# Patient Record
Sex: FEMALE | Race: WHITE | Employment: FULL TIME | URBAN - METROPOLITAN AREA
[De-identification: names, ages, dates, MRNs, and addresses within clinical notes are randomized per-mention and may not be internally consistent; named-entity substitution may affect disease eponyms.]

---

## 2022-01-17 NOTE — PROGRESS NOTES
HPI: Claudia Pickett (: 1970) is a 54 y.o. female, patient, here for evaluation of the following chief complaint(s):    Arm Pain (right arm pain for \"a few months\". no known injury. hurts to extend. decreased ROM. occasional numbness in 4th and 5th fingers.)  Patient is seen today to evaluate her right arm. She has complained of difficulty lifting a cup of coffee with her right arm especially over the last couple of months. She denies any fall or other injury. The pain started in the fall. She participates in barre class and is having difficulty with planks. She had to discontinue in November. She complains of a combination of pain to the lateral aspect of the right elbow as well as numbness, tingling and paresthesias in the ulnar small and ring fingers. She denies any problems with the left arm and is seen for further treatment of her right arm. Vitals:  Ht 5' 4\" (1.626 m)   Wt 135 lb (61.2 kg)   BMI 23.17 kg/m²    Body mass index is 23.17 kg/m². Allergies   Allergen Reactions    Erythromycin Unknown (comments)     Chest tightness    Sulfa (Sulfonamide Antibiotics) Other (comments)    Tree Nut Hives and Other (comments)     Chest pain       Current Outpatient Medications   Medication Sig    linaclotide (LINZESS PO) Take  by mouth.  methylPREDNISolone (MEDROL DOSEPACK) 4 mg tablet Per dose pack instructions    diclofenac sodium (Pennsaid) 2 % sopk 2 Pump(s) by Apply Externally route two (2) times a day.  fexofenadine (ALLEGRA) 180 mg tablet Take  by mouth daily. No current facility-administered medications for this visit.        Past Medical History:   Diagnosis Date    Allergic rhinitis     Dr. Naomi Leal        Past Surgical History:   Procedure Laterality Date    HX GYN      csxn    HX TONSILLECTOMY  1977       Family History   Problem Relation Age of Onset    Lung Disease Mother         mulitple pneumonias    Cancer Father 64        prostate        Social History     Tobacco Use    Smoking status: Never Smoker    Smokeless tobacco: Never Used   Substance Use Topics    Alcohol use: Yes     Alcohol/week: 1.7 standard drinks     Types: 2 drink(s) per week    Drug use: Not on file        Review of Systems    ROS     Positive for: Musculoskeletal    Negative for: Constitutional, Gastrointestinal, Neurological, Skin, Genitourinary, HENT, Endocrine, Cardiovascular, Eyes, Respiratory, Psychiatric, Allergic/Imm, Heme/Lymph    Last edited by Justin Engle on 1/18/2022  3:45 PM. (History)             Physical Exam    Patient's right elbow is full range of motion but there is point tenderness to the lateral condyle and pain worsened during elbow extension resisted wrist extension testing. She also has tenderness in the cubital tunnel with a positive Tinel's sign producing paresthesias ulnarly into the hand. There is good intrinsic strength. Normal left elbow examination of the left wrist reveals no swelling, edema or warmth, no tenderness to palpation, no pain, normal strength and tone, normal range of motion, no crepitus, normal sensation, no instability or laxity and no known fractures or deformities. Examination of the right wrist reveals no swelling, edema or warmth, no tenderness to palpation, no pain, normal strength and tone, normal range of motion, no crepitus, normal sensation, no instability or laxity and no known fractures or deformities. Examination of the left hand reveals no tenderness to palpation, no pain, normal  strength, normal tone, normal range of motion, no crepitus, no instability or laxity, no known fractures or deformities and normal sensation. Examination of the right hand reveals no tenderness to palpation, no pain, normal  strength, normal tone, normal range of motion, no crepitus, no instability or laxity, no known fractures or deformities and normal sensation.     Imaging:      XR Results (most recent):  Results from Appointment encounter on 01/18/22    XR ELBOW RT AP/LAT    Narrative  AP, lateral x-ray of the right elbow show a bone island in the capitellum but no advanced arthritis or fracture seen. Slight osteopenia. ASSESSMENT/PLAN:  Below is the assessment and plan developed based on review of pertinent history, physical exam, labs, studies, and medications. Patient examination was clinically consistent with right elbow lateral epicondylitis and ulnar neuropathy. She has been symptomatic since the fall and has affected her workout regimen. I recommend an EMG assessment to evaluate for ulnar neuropathy at the elbow. She received home exercise instruction sheets and a prescription for outpatient therapy. I will see her back after the EMG is completed for further review. 1. Right arm pain  -     XR ELBOW RT AP/LAT; Future  -     methylPREDNISolone (MEDROL DOSEPACK) 4 mg tablet; Per dose pack instructions, Normal, Disp-1 Dose Pack, R-0  -     diclofenac sodium (Pennsaid) 2 % sopk; 2 Pump(s) by Apply Externally route two (2) times a day., Normal, Disp-1 Packet, R-3  -     EMG ONE EXTREMITY UPPER RT; Future  2. Lateral epicondylitis of right elbow  3. Ulnar neuropathy at elbow, right      Return for Follow-up after EMG test completion. An electronic signature was used to authenticate this note.   -- Jany Leal MD

## 2022-01-18 ENCOUNTER — OFFICE VISIT (OUTPATIENT)
Dept: ORTHOPEDIC SURGERY | Age: 52
End: 2022-01-18
Payer: COMMERCIAL

## 2022-01-18 VITALS — HEIGHT: 64 IN | WEIGHT: 135 LBS | BODY MASS INDEX: 23.05 KG/M2

## 2022-01-18 DIAGNOSIS — G56.21 ULNAR NEUROPATHY AT ELBOW, RIGHT: ICD-10-CM

## 2022-01-18 DIAGNOSIS — M77.11 LATERAL EPICONDYLITIS OF RIGHT ELBOW: ICD-10-CM

## 2022-01-18 DIAGNOSIS — M79.601 RIGHT ARM PAIN: Primary | ICD-10-CM

## 2022-01-18 PROCEDURE — 99203 OFFICE O/P NEW LOW 30 MIN: CPT | Performed by: ORTHOPAEDIC SURGERY

## 2022-01-18 RX ORDER — DICLOFENAC SODIUM 20 MG/G
2 SOLUTION TOPICAL 2 TIMES DAILY
Qty: 1 PACKET | Refills: 3 | Status: SHIPPED | OUTPATIENT
Start: 2022-01-18

## 2022-01-18 RX ORDER — METHYLPREDNISOLONE 4 MG/1
TABLET ORAL
Qty: 1 DOSE PACK | Refills: 0 | Status: SHIPPED | OUTPATIENT
Start: 2022-01-18

## 2022-01-18 NOTE — PATIENT INSTRUCTIONS
Tennis Elbow: Care Instructions  Overview     Tennis elbow is soreness or pain on the outer part of the elbow. The pain occurs when the tendon is stretched and becomes irritated by repeated twisting of the hand, wrist, and forearm. A tendon is a tough tissue that connects muscle to bone. This injury is common in tennis players. But you also can get it from many activities that work the same muscles. Examples include gardening, painting, and using tools. Tennis elbow usually heals with rest and treatment at home. Follow-up care is a key part of your treatment and safety. Be sure to make and go to all appointments, and call your doctor if you are having problems. It's also a good idea to know your test results and keep a list of the medicines you take. How can you care for yourself at home?    · Rest your fingers, wrist, and forearm. Try to stop or reduce any activity that causes elbow pain. You may have to rest your arm for weeks to months. Follow your doctor's directions for how long to rest.     · Put ice or a cold pack on your elbow for 10 to 20 minutes at a time. Try to do this every 1 to 2 hours for the next 3 days (when you are awake) or until the swelling goes down. Put a thin cloth between the ice and your skin. You can try heat, or alternating heat and ice, after the first 3 days.     · If your doctor gave you a brace or splint, use it as directed. A \"counterforce\" brace is a strap around your forearm, just below your elbow. It may ease the pressure on the tendon and spread force throughout your arm.     · Prop up your elbow on pillows to help reduce swelling.     · Follow your doctor's or physical therapist's directions for exercise.     · Return to your usual activities slowly.     · Try to prevent the problem. Learn the best techniques for your sport. For example, make sure the  on your tennis racquet is not too big for your hand.  Try not to hit a tennis ball late in your swing.     · If you work, consider asking your employer about new ways of doing your job if your elbow pain is caused by something you do at work. Medicines    · Be safe with medicines. Read and follow all instructions on the label. ? If the doctor gave you a prescription medicine for pain, take it as prescribed. ? If you are not taking a prescription pain medicine, ask your doctor if you can take an over-the-counter medicine. When should you call for help? Call your doctor now or seek immediate medical care if:    · Your pain is worse.     · You cannot bend your elbow normally.     · Your arm or hand is cool or pale or changes color.     · You have tingling, weakness, or numbness in your hand and fingers. Watch closely for changes in your health, and be sure to contact your doctor if:    · You have work problems caused by your elbow pain.     · Your pain is not better after 2 weeks. Where can you learn more? Go to http://www.gray.com/  Enter C285 in the search box to learn more about \"Tennis Elbow: Care Instructions. \"  Current as of: July 1, 2021               Content Version: 13.0  © 0094-1116 [employer]. Care instructions adapted under license by [employer] (which disclaims liability or warranty for this information). If you have questions about a medical condition or this instruction, always ask your healthcare professional. [place] [address] any warranty or liability for your use of this information. Tennis Elbow: Exercises  Introduction  Here are some examples of exercises for you to try. The exercises may be suggested for a condition or for rehabilitation. Start each exercise slowly. Ease off the exercises if you start to have pain. You will be told when to start these exercises and which ones will work best for you. How to do the exercises  Wrist flexor stretch    1. Extend your arm in front of you with your palm up.   2. Seminole gutierrez wrist, pointing your hand toward the floor. 3. With your other hand, gently bend your wrist farther until you feel a mild to moderate stretch in your forearm. 4. Hold for at least 15 to 30 seconds. Repeat 2 to 4 times. Wrist extensor stretch    1. Repeat steps 1 to 4 of the stretch above but begin with your extended hand palm down. Ball or sock squeeze    1. Hold a tennis ball (or a rolled-up sock) in your hand. 2. Make a fist around the ball (or sock) and squeeze. 3. Hold for about 6 seconds, and then relax for up to 10 seconds. 4. Repeat 8 to 12 times. 5. Switch the ball (or sock) to your other hand and do 8 to 12 times. Wrist deviation    1. Sit so that your arm is supported but your hand hangs off the edge of a flat surface, such as a table. 2. Hold your hand out like you are shaking hands with someone. 3. Move your hand up and down. 4. Repeat this motion 8 to 12 times. 5. Switch arms. 6. Try to do this exercise twice with each hand. Wrist curls    1. Place your forearm on a table with your hand hanging over the edge of the table, palm up. 2. Place a 1- to 2-pound weight in your hand. This may be a dumbbell, a can of food, or a filled water bottle. 3. Slowly raise and lower the weight while keeping your forearm on the table and your palm facing up. 4. Repeat this motion 8 to 12 times. 5. Switch arms, and do steps 1 through 4.  6. Repeat with your hand facing down toward the floor. Switch arms. Biceps curls    1. Sit leaning forward with your legs slightly spread and your left hand on your left thigh. 2. Place your right elbow on your right thigh, and hold the weight with your forearm horizontal.  3. Slowly curl the weight up and toward your chest.  4. Repeat this motion 8 to 12 times. 5. Switch arms, and do steps 1 through 4. Follow-up care is a key part of your treatment and safety. Be sure to make and go to all appointments, and call your doctor if you are having problems.  It's also a good idea to know your test results and keep a list of the medicines you take. Where can you learn more? Go to http://hector-whitney.info/  Enter E539 in the search box to learn more about \"Tennis Elbow: Exercises. \"  Current as of: July 1, 2021               Content Version: 13.0  © 2006-2021 [employer]. Care instructions adapted under license by [employer] (which disclaims liability or warranty for this information). If you have questions about a medical condition or this instruction, always ask your healthcare professional. [address] any warranty or liability for your use of this information.

## 2023-01-15 ENCOUNTER — HOSPITAL ENCOUNTER (EMERGENCY)
Age: 53
Discharge: HOME | End: 2023-01-15
Attending: STUDENT IN AN ORGANIZED HEALTH CARE EDUCATION/TRAINING PROGRAM
Payer: COMMERCIAL

## 2023-01-15 ENCOUNTER — APPOINTMENT (OUTPATIENT)
Dept: CT IMAGING | Age: 53
End: 2023-01-15
Attending: NURSE PRACTITIONER
Payer: COMMERCIAL

## 2023-01-15 ENCOUNTER — APPOINTMENT (OUTPATIENT)
Dept: GENERAL RADIOLOGY | Age: 53
End: 2023-01-15
Attending: STUDENT IN AN ORGANIZED HEALTH CARE EDUCATION/TRAINING PROGRAM
Payer: COMMERCIAL

## 2023-01-15 VITALS
WEIGHT: 127.87 LBS | HEIGHT: 64 IN | OXYGEN SATURATION: 100 % | SYSTOLIC BLOOD PRESSURE: 139 MMHG | BODY MASS INDEX: 21.83 KG/M2 | TEMPERATURE: 97.8 F | HEART RATE: 90 BPM | RESPIRATION RATE: 11 BRPM | DIASTOLIC BLOOD PRESSURE: 79 MMHG

## 2023-01-15 DIAGNOSIS — R00.0 TACHYCARDIA: Primary | ICD-10-CM

## 2023-01-15 DIAGNOSIS — R06.02 SOB (SHORTNESS OF BREATH): ICD-10-CM

## 2023-01-15 LAB
ALBUMIN SERPL-MCNC: 4.1 G/DL (ref 3.5–5)
ALBUMIN/GLOB SERPL: 1.1 (ref 1.1–2.2)
ALP SERPL-CCNC: 66 U/L (ref 45–117)
ALT SERPL-CCNC: 16 U/L (ref 12–78)
ANION GAP SERPL CALC-SCNC: 11 MMOL/L (ref 5–15)
AST SERPL-CCNC: 13 U/L (ref 15–37)
BASOPHILS # BLD: 0.1 K/UL (ref 0–0.1)
BASOPHILS NFR BLD: 1 % (ref 0–1)
BILIRUB SERPL-MCNC: 0.3 MG/DL (ref 0.2–1)
BUN SERPL-MCNC: 15 MG/DL (ref 6–20)
BUN/CREAT SERPL: 17 (ref 12–20)
CALCIUM SERPL-MCNC: 9.7 MG/DL (ref 8.5–10.1)
CHLORIDE SERPL-SCNC: 105 MMOL/L (ref 97–108)
CO2 SERPL-SCNC: 22 MMOL/L (ref 21–32)
COMMENT, HOLDF: NORMAL
CREAT SERPL-MCNC: 0.87 MG/DL (ref 0.55–1.02)
DIFFERENTIAL METHOD BLD: ABNORMAL
EOSINOPHIL # BLD: 0.1 K/UL (ref 0–0.4)
EOSINOPHIL NFR BLD: 1 % (ref 0–7)
ERYTHROCYTE [DISTWIDTH] IN BLOOD BY AUTOMATED COUNT: 13.6 % (ref 11.5–14.5)
FLUAV AG NPH QL IA: NEGATIVE
FLUBV AG NOSE QL IA: NEGATIVE
GLOBULIN SER CALC-MCNC: 3.7 G/DL (ref 2–4)
GLUCOSE SERPL-MCNC: 171 MG/DL (ref 65–100)
HCT VFR BLD AUTO: 45.2 % (ref 35–47)
HGB BLD-MCNC: 14.7 G/DL (ref 11.5–16)
IMM GRANULOCYTES # BLD AUTO: 0 K/UL (ref 0–0.04)
IMM GRANULOCYTES NFR BLD AUTO: 0 % (ref 0–0.5)
LYMPHOCYTES # BLD: 3.3 K/UL (ref 0.8–3.5)
LYMPHOCYTES NFR BLD: 28 % (ref 12–49)
MCH RBC QN AUTO: 29.5 PG (ref 26–34)
MCHC RBC AUTO-ENTMCNC: 32.5 G/DL (ref 30–36.5)
MCV RBC AUTO: 90.6 FL (ref 80–99)
MONOCYTES # BLD: 0.6 K/UL (ref 0–1)
MONOCYTES NFR BLD: 5 % (ref 5–13)
NEUTS SEG # BLD: 7.6 K/UL (ref 1.8–8)
NEUTS SEG NFR BLD: 65 % (ref 32–75)
NRBC # BLD: 0 K/UL (ref 0–0.01)
NRBC BLD-RTO: 0 PER 100 WBC
PLATELET # BLD AUTO: 332 K/UL (ref 150–400)
PMV BLD AUTO: 10.6 FL (ref 8.9–12.9)
POTASSIUM SERPL-SCNC: 3.3 MMOL/L (ref 3.5–5.1)
PROT SERPL-MCNC: 7.8 G/DL (ref 6.4–8.2)
RBC # BLD AUTO: 4.99 M/UL (ref 3.8–5.2)
SAMPLES BEING HELD,HOLD: NORMAL
SARS-COV-2, COV2: NORMAL
SARS-COV-2, XPLCVT: NOT DETECTED
SODIUM SERPL-SCNC: 138 MMOL/L (ref 136–145)
SOURCE, COVRS: NORMAL
TROPONIN-HIGH SENSITIVITY: 4 NG/L (ref 0–51)
TSH SERPL DL<=0.05 MIU/L-ACNC: 2.34 UIU/ML (ref 0.36–3.74)
WBC # BLD AUTO: 11.7 K/UL (ref 3.6–11)

## 2023-01-15 PROCEDURE — 96361 HYDRATE IV INFUSION ADD-ON: CPT

## 2023-01-15 PROCEDURE — 74011250636 HC RX REV CODE- 250/636: Performed by: NURSE PRACTITIONER

## 2023-01-15 PROCEDURE — 87804 INFLUENZA ASSAY W/OPTIC: CPT

## 2023-01-15 PROCEDURE — 80053 COMPREHEN METABOLIC PANEL: CPT

## 2023-01-15 PROCEDURE — 96360 HYDRATION IV INFUSION INIT: CPT

## 2023-01-15 PROCEDURE — U0005 INFEC AGEN DETEC AMPLI PROBE: HCPCS

## 2023-01-15 PROCEDURE — 84484 ASSAY OF TROPONIN QUANT: CPT

## 2023-01-15 PROCEDURE — 84443 ASSAY THYROID STIM HORMONE: CPT

## 2023-01-15 PROCEDURE — 99285 EMERGENCY DEPT VISIT HI MDM: CPT

## 2023-01-15 PROCEDURE — 85025 COMPLETE CBC W/AUTO DIFF WBC: CPT

## 2023-01-15 PROCEDURE — 93005 ELECTROCARDIOGRAM TRACING: CPT

## 2023-01-15 PROCEDURE — 74011250637 HC RX REV CODE- 250/637: Performed by: NURSE PRACTITIONER

## 2023-01-15 PROCEDURE — 36415 COLL VENOUS BLD VENIPUNCTURE: CPT

## 2023-01-15 PROCEDURE — 71275 CT ANGIOGRAPHY CHEST: CPT

## 2023-01-15 PROCEDURE — 74011000636 HC RX REV CODE- 636: Performed by: RADIOLOGY

## 2023-01-15 RX ORDER — POTASSIUM CHLORIDE 750 MG/1
40 TABLET, FILM COATED, EXTENDED RELEASE ORAL
Status: COMPLETED | OUTPATIENT
Start: 2023-01-15 | End: 2023-01-15

## 2023-01-15 RX ORDER — SODIUM CHLORIDE 9 MG/ML
1000 INJECTION, SOLUTION INTRAVENOUS ONCE
Status: COMPLETED | OUTPATIENT
Start: 2023-01-15 | End: 2023-01-15

## 2023-01-15 RX ADMIN — IOPAMIDOL 80 ML: 755 INJECTION, SOLUTION INTRAVENOUS at 15:19

## 2023-01-15 RX ADMIN — SODIUM CHLORIDE 1000 ML: 9 INJECTION, SOLUTION INTRAVENOUS at 15:40

## 2023-01-15 RX ADMIN — POTASSIUM CHLORIDE 40 MEQ: 750 TABLET, FILM COATED, EXTENDED RELEASE ORAL at 18:03

## 2023-01-15 NOTE — ED PROVIDER NOTES
Jd Haro is a 50 y.o. female with Hx of allergic rhinitis and raynaud's who presents by herself to [facility] ED with cc of shortness of breath and chest tightness. Patient states she was sitting on the sofa watching football and she felt her heart rate go up. And became short of breath. She does state this happened to her 1 time before. Patient travels back and forth to [place] for her job. Her heart rate is elevated at 115. She feels like she has chest tightness but not necessarily chest pain and she is short of breath. She denies recent cough or cold. She denies any abdominal pain she denies any difficulty when she voids. She denies cigarettes drugs or alcohol. PCP: Ranjit Golden MD    There are no other complaints, changes or physical findings at this time. Shortness of Breath  Pertinent negatives include no fever, no headaches, no rhinorrhea, no sore throat, no cough, no chest pain, no vomiting, no rash and no leg swelling. Rapid Heart Rate  Associated symptoms include shortness of breath. Pertinent negatives include no chest pain and no headaches. Shortness of Breath  Pertinent negatives include no fever, no headaches, no rhinorrhea, no sore throat, no cough, no chest pain, no vomiting, no rash and no leg swelling. Rapid Heart Rate  Associated symptoms include shortness of breath. Pertinent negatives include no chest pain and no headaches. Shortness of Breath  Pertinent negatives include no fever, no headaches, no rhinorrhea, no sore throat, no cough, no chest pain, no vomiting and no rash. Rapid Heart Rate  Associated symptoms include shortness of breath. Pertinent negatives include no chest pain and no headaches.       Past Medical History:   Diagnosis Date    Allergic rhinitis     Dr. Sobia Farris       Past Surgical History:   Procedure Laterality Date    HX GYN  1999    csxn    HX TONSILLECTOMY  1977         Family History:   Problem Relation Age of Onset    Lung Disease Mother mulitple pneumonias    Cancer Father 64        prostate       Social History     Socioeconomic History    Marital status: SINGLE     Spouse name: Not on file    Number of children: Not on file    Years of education: Not on file    Highest education level: Not on file   Occupational History    Not on file   Tobacco Use    Smoking status: Never    Smokeless tobacco: Never   Substance and Sexual Activity    Alcohol use: Yes     Alcohol/week: 1.7 standard drinks     Types: 2 drink(s) per week    Drug use: Not on file    Sexual activity: Not on file   Other Topics Concern    Not on file   Social History Narrative    Not on file     Social Determinants of Health     Financial Resource Strain: Not on file   Food Insecurity: Not on file   Transportation Needs: Not on file   Physical Activity: Not on file   Stress: Not on file   Social Connections: Not on file   Intimate Partner Violence: Not on file   Housing Stability: Not on file         ALLERGIES: Erythromycin, Sulfa (sulfonamide antibiotics), and Tree nut    Review of Systems   Constitutional:  Negative for activity change, appetite change, chills and fever. HENT:  Negative for congestion, rhinorrhea and sore throat. Eyes:  Negative for visual disturbance. Respiratory:  Positive for chest tightness and shortness of breath. Negative for cough. Cardiovascular:  Positive for palpitations. Negative for chest pain and leg swelling. Gastrointestinal:  Negative for abdominal distention, diarrhea, nausea and vomiting. Genitourinary:  Negative for difficulty urinating, dysuria and menstrual problem. Musculoskeletal:  Negative for arthralgias, gait problem and myalgias. Skin:  Negative for color change and rash. Neurological:  Negative for weakness and headaches. Psychiatric/Behavioral:  Negative for agitation, behavioral problems and confusion.       Vitals:    01/15/23 1414   BP: (!) 161/69   Pulse: (!) 115   Resp: 24   Temp: 97.8 °F (36.6 °C) SpO2: 100%   Weight: 58 kg (127 lb 13.9 oz)   Height: 5' 4\" (1.626 m)            Physical Exam  Vitals and nursing note reviewed. Constitutional:       Appearance: Normal appearance. She is normal weight. Interventions: She is not intubated. HENT:      Head: Normocephalic and atraumatic. Right Ear: External ear normal.      Left Ear: External ear normal.   Eyes:      Extraocular Movements: Extraocular movements intact. Conjunctiva/sclera: Conjunctivae normal.      Pupils: Pupils are equal, round, and reactive to light. Cardiovascular:      Rate and Rhythm: Regular rhythm. Tachycardia present. Pulmonary:      Effort: Pulmonary effort is normal. Tachypnea present. No bradypnea, accessory muscle usage or respiratory distress. She is not intubated. Breath sounds: Normal breath sounds. No stridor. No decreased breath sounds, wheezing, rhonchi or rales. Chest:      Chest wall: No mass, deformity, tenderness, crepitus or edema. There is no dullness to percussion. Abdominal:      General: Abdomen is flat. Palpations: Abdomen is soft. Musculoskeletal:         General: Normal range of motion. Cervical back: Normal range of motion and neck supple. Skin:     General: Skin is warm and dry. Neurological:      General: No focal deficit present. Mental Status: She is alert and oriented to person, place, and time. Mental status is at baseline. Psychiatric:         Mood and Affect: Mood normal.         Thought Content: Thought content normal.         Judgment: Judgment normal.        Medical Decision Making  Differential diagnosis; PE, dehydration, MI, anxiety. 72-year-old female in with tachycardia and some tachypnea. States that came on suddenly while she was sitting on the sofa watching football. Patient past medical history is Raynaud's. Patient is healthy normally exercises on a regular basis. Patient is anxious appearing.   Patient meets [place] criteria for DVT PE. CTA ordered. Labs obtained. States was normal.  Patient had negative CT, her potassium was 3.3 we repleted with 40 p.o. She had negative troponin. She received a liter of fluid. She does have a left bundle branch block. I have referred her to Eduarda Godinez with cardiology. She is instructed to give them a call tomorrow to increase her fluid intake. She had a negative flu. We discussed her checking her COVID results in 3 days on the [employer] katelyn. Vital signs stable at time of discharge. Heart rate at 90. Patient amendable to plan. Amount and/or Complexity of Data Reviewed  Labs: ordered. Radiology: ordered. ECG/medicine tests: ordered. Risk  Prescription drug management.       ED Course as of 01/15/23 1431   Sun Kenneth 15, 2023   1427 ECG performed at 1419 shows sinus tachycardia, ventricular rate 117, left bundle branch block, normal axis, no STEMI. [WG]      ED Course User Index  [WG] David Tomlin, DO       Procedures

## 2023-01-15 NOTE — ED TRIAGE NOTES
Patient is coming in for rapid HR and dizziness that started today. Patient has been having left arm pain for the past 2 days but works out and contributes to workout. Patient states the HR of greater than 140 lasted over 15 minutes.

## 2023-01-16 LAB
ATRIAL RATE: 117 BPM
CALCULATED P AXIS, ECG09: 81 DEGREES
CALCULATED R AXIS, ECG10: 41 DEGREES
CALCULATED T AXIS, ECG11: 119 DEGREES
DIAGNOSIS, 93000: NORMAL
P-R INTERVAL, ECG05: 154 MS
Q-T INTERVAL, ECG07: 340 MS
QRS DURATION, ECG06: 124 MS
QTC CALCULATION (BEZET), ECG08: 474 MS
VENTRICULAR RATE, ECG03: 117 BPM